# Patient Record
(demographics unavailable — no encounter records)

---

## 2025-03-12 NOTE — PHYSICAL EXAM
[Normocephalic] : normocephalic [Atraumatic] : atraumatic [EOMI] : extra ocular movement intact [Sclera nonicteric] : sclera nonicteric [Supple] : supple [No Supraclavicular Adenopathy] : no supraclavicular adenopathy [No Cervical Adenopathy] : no cervical adenopathy [No Thyromegaly] : no thyromegaly [Examined in the supine and seated position] : examined in the supine and seated position [Asymmetrical] : asymmetrical [No dominant masses] : no dominant masses in right breast  [No dominant masses] : no dominant masses left breast [No Nipple Retraction] : no left nipple retraction [No Nipple Discharge] : no left nipple discharge [No Axillary Lymphadenopathy] : no left axillary lymphadenopathy [No Edema] : no edema [No Rashes] : no rashes [No Ulceration] : no ulceration [Clear to Auscultation Bilat] : clear to auscultation bilaterally [Normal Sinus Rhythm] : normal sinus rhythm [Normal S1, S2] : normal S1 and S2 [No Gallops] : no gallops [No Rubs] : no pericardial rub [Bra Size: ___] : Bra Size: [unfilled] [de-identified] : Left breast is larger than the right.  [de-identified] : Well healed right lower and right axillary incisions. No palpable breast masses. Core scar:

## 2025-03-12 NOTE — PHYSICAL EXAM
[Normocephalic] : normocephalic [Atraumatic] : atraumatic [EOMI] : extra ocular movement intact [Sclera nonicteric] : sclera nonicteric [Supple] : supple [No Supraclavicular Adenopathy] : no supraclavicular adenopathy [No Cervical Adenopathy] : no cervical adenopathy [No Thyromegaly] : no thyromegaly [Examined in the supine and seated position] : examined in the supine and seated position [Asymmetrical] : asymmetrical [No dominant masses] : no dominant masses in right breast  [No dominant masses] : no dominant masses left breast [No Nipple Retraction] : no left nipple retraction [No Nipple Discharge] : no left nipple discharge [No Axillary Lymphadenopathy] : no left axillary lymphadenopathy [No Edema] : no edema [No Rashes] : no rashes [No Ulceration] : no ulceration [Clear to Auscultation Bilat] : clear to auscultation bilaterally [Normal Sinus Rhythm] : normal sinus rhythm [Normal S1, S2] : normal S1 and S2 [No Gallops] : no gallops [No Rubs] : no pericardial rub [Bra Size: ___] : Bra Size: [unfilled] [de-identified] : Left breast is larger than the right.  [de-identified] : Well healed right lower and right axillary incisions. No palpable breast masses. Core scar:

## 2025-03-12 NOTE — CONSULT LETTER
[Dear  ___] : Dear  [unfilled], [Courtesy Letter:] : I had the pleasure of seeing your patient, [unfilled], in my office today. [Please see my note below.] : Please see my note below. [Sincerely,] : Sincerely, [DrPapito  ___] : Dr. GARDNER [DrPapito ___] : Dr. GARDNER [FreeTextEntry3] : Susan M. Palleschi, MD, FACS Division of Breast Surgery Director, Breast Surgery [address] [address] (Phone) [phone] (Fax) [phone]

## 2025-03-12 NOTE — ASSESSMENT
[FreeTextEntry1] : History of Stage IA right breast cancer, s/p right lumpectomy and XRT in 2014, followed by antihormonal medication. 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 I had an extensive discussion with Agnieszka informing her that this is a Stage 0 cancer which has an excellent prognosis with appropriate treatment.    1. Pending completion of her workup, the surgical treatment options of a right Belkys  localization wide excision lumpectomy, followed by post-operative XRT (to reduce risk of local recurrence), vs. right total mastectomy, vs. bilateral total mastectomies with injection of bilateral breasts with Magtrace, if she should so choose, were discussed in detail. The indications, alternatives, benefits and risks were discussed, including but not limited to bleeding, infection, pain, scar, swelling, numbness, change in breast appearance, recurrence (1% risk per year with breast conservation versus <=5% lifetime risk with mastectomy), potential need for additional surgery and lymphedema (if she undergoes LN bx; 5-8% risk with SLN biopsy, 30% risk with axillary LN dissection). The breast cancer booklet, breast cancer treatment plan and postoperative instructions were reviewed and provided to the patient. 2. The lymphedema instruction sheet was reviewed and provided (only applies if she were to need a LN biopsy). 3. I discussed with her the potential for postoperative radiation therapy. She has a prior history of right breast XRT 11 years ago. I will arrange for a preop consultation with rad onc to determine if 2nd course of XRT is feasible. 4. I discussed with her the potential for postoperative adjuvant therapy based on the final results of the surgical path, including the possibility of chemotherapy (if invasive cancer diagnosed; 15% risk of upgrade to invasive cancer on surgical pathology) and/or anti-hormonal therapy as indicated. I will refer her back to Dr. Green. 5. Slide review: to be requested prior to proceeding with surgery. 6. Bilateral breast MRI with IV contrast: advise preop to rule out additional areas of disease for surgical planning. 7. Genetic testing: advise comprehensive genetic testing. I discussed with her that if the genetic testing reveals a breast cancer mutation, I will discuss with her the potential for bilateral mastectomies, however, given her age, breast conservation therapy will likely be more appropriate. [facility] genetic testing drawn today. I will call her with results. 8. Reconstruction: discussed with patient option of reconstruction if she undergoes mastectomy(ies). I discussed with her the potential for oncoplastic reduction/lift if she were to undergo lumpectomy. I discussed her case with Dr. Chandler who offered potential left breast reduction/lift at the time of right lumpectomy.  9. She states that if her MRI, radiation oncology consultation and genetic testing allow the feasibility of breast conservation, she would likely opt for lumpectomy. 10 Her surgery will be scheduled thereafter. 11. Annual bilateral mammogram and breast ultrasound due 2/2025 12. Biomarkers pending.   This patient encounter took 70 minutes today to perform records review, chart preparation, clinical encounter, coordination of care and documentation. I personally reviewed her breast imaging.

## 2025-03-12 NOTE — HISTORY OF PRESENT ILLNESS
[FreeTextEntry1] : The patient is a 78 year old female here today for a preop office visit for newly diagnosed right breast DCIS. PCP: Dr. Katarzyna Chand Gyn: Dr. Naima Henley Pulmonologist: Dr. Leonardo Gage (asthma) She has a history of right breast cancer (Stage IA, 1/29/2014, age 67), s/p right 6:00 lumpectomy, right axillary sentinel lymph node biopsy. Pathology invasive ductal carcinoma, grade 2, measured 1 cm, 1 LN negative, ER+/FL+/Her2- Rad Onc: Dr. Ruvalcaba, completed XRT Med Onc: Dr. Green, completed 5 years + of anithormonal medication (last being Tamoxifen) completed 1/31/2020, sees her prn. No family history of breast or ovarian cancer. Her paternal grandmother and maternal grandfather had lung cancer.  No genetic testing done previously. She is s/p benign left breast MRI guided biopsy on 1/21/2014 and benign right breast ultrasound guided core biopsy on 7/14/2014. 2/27/2025 Bilateral Mammogram: There are scattered areas of fibroglandular density. In the lower central RIGHT breast, anterior to middle depth, there are loosely grouped calcifications in linear distribution. There are stable postsurgical changes in the central posterior RIGHT breast and RIGHT axilla. No suspicious mass, suspicious microcalcifications, or other sign of malignancy is identified in the LEFT breast. Bilateral biopsy clips remain in place. Stable benign calcifications in the central posterior LEFT breast. BREAST ARTERIAL CALCIFICATION (CIERA): Grade 0 - No vascular calcifications. Note: The absence of breast arterial calcification does not exclude cardiovascular disease. Management of cardiovascular risk factors should be based clinically. Bilateral ultrasound:  RIGHT BREAST: No suspicious solid mass. Stable postsurgical scarring at 6:00 in the RIGHT breast. LEFT BREAST: No suspicious solid mass. BI-RADS 0, recommend further evaluation with right diagnostic mammography. 3/3/2025 Right diagnostic mammogram: A grouping of indeterminate calcifications is seen in the central lower breast. Multiple additional similar appearing calcifications are noted in a linear distribution. Some of these may be vascular, but further evaluation with stereotactic biopsy is advised. BI-RADS 4A, advise right stereotactic guided core biopsy. 3/7/2025 Right breast lower central stereotactic guided core biopsy (cork clip). Pathology: - DUCTAL CARCINOMA IN SITU, LOW TO INTERMEDIATE NUCLEAR GRADE, SOLID TYPE, WITH CALCIFICATIONS AND CENTRAL NECROSIS. - ER/FL STUDY IN PROGRESS; AN ADDENDUM WILL FOLLOW. These results are CONCORDANT. Given the malignant biopsy results above, surgical consultation and management is recommended. Please note the biopsied calcifications span approximately 3.5 cm in AP extent, to within 1.8 cm of the right nipple on magnification views from the study dated 3/3/2025. She is here with her sister in law, Marlene Cotton

## 2025-03-12 NOTE — CONSULT LETTER
[Dear  ___] : Dear  [unfilled], [Courtesy Letter:] : I had the pleasure of seeing your patient, [unfilled], in my office today. [Please see my note below.] : Please see my note below. [Sincerely,] : Sincerely, [DrPapito  ___] : Dr. GARDNER [DrPapito ___] : Dr. GARDNER [FreeTextEntry3] : Susan M. Palleschi, MD, FACS Division of Breast Surgery Director, Breast Surgery [address] [address] (Phone) [phone] (Fax) [phone]

## 2025-03-31 NOTE — VITALS
[Maximal Pain Intensity: 0/10] : 0/10 [90: Able to carry normal activity; minor signs or symptoms of disease.] : 90: Able to carry normal activity; minor signs or symptoms of disease.  [3 - Distress Level] : Distress Level: 3

## 2025-04-05 NOTE — PHYSICAL EXAM
[Sclera] : the sclera and conjunctiva were normal [Outer Ear] : the ears and nose were normal in appearance [] : no rash [No Focal Deficits] : no focal deficits [Normal] : oriented to person, place and time, the affect was normal, the mood was normal and not anxious [Heart Rate And Rhythm] : heart rate and rhythm were normal [Abdomen Soft] : soft [Nondistended] : nondistended [Axillary Lymph Nodes Enlarged Bilaterally] : axillary [Supraclavicular Lymph Nodes Enlarged Bilaterally] : supraclavicular [de-identified] : Right breast with well-healed lumpectomy site at 6:00 adjacent to inframammary fold with mild fibrosis and retraction of breast, steri-strip over the fresh biopsy close to right nipple.  No erythema.

## 2025-04-05 NOTE — HISTORY OF PRESENT ILLNESS
[FreeTextEntry1] : Ms. Ortiz is a 78-year-old female with newly diagnosed right DCIS, ER/NH positive, and presents for radiotherapy recommendations.   She has a history of a Stage IA right breast cancer treated with a lumpectomy and adjuvant radiation therapy in 2014. Lumpectomy at 6:00 showed a 1 cm invasive ductal carcinoma, grade 2, ER+NH+HER2 negative. She received a course of adjuvant radiation therapy to the right breast, a total dose of 6040 cGy from 3/17/2014 - 5/1/2014 under the care of Dr. Carmela Ruvalcaba. She completed 5 years of tamoxifen in 2020 under the care of Dr. Eliza Green.   Bilateral mammogram on 2/27/2025 showed scattered areas of fibroglandular density. In the lower central right breast, anterior to middle depth, there were loosely grouped calcifications in linear distribution. There were stable postsurgical changes in the central posterior right breast and right axilla. No suspicious mass, suspicious microcalcifications, or other sign of malignancy was identified in the left breast. Stable benign calcifications in the central posterior left breast.  Right diagnostic mammogram 3/3/2025 showed a grouping of indeterminate calcifications is seen in the central lower breast. Multiple additional similar appearing calcifications were noted in a linear distribution.   On 3/7/2025, she underwent a right breast lower central stereotactic guided core biopsy. Pathology showed DCIS, low to intermediate nuclear grade, with calcifications and central necrosis.  ER positive 98%, NH positive 90%.  MRI breast 3/19/2025 demonstrated a biopsy clip susceptibility artifact associated with a small hematoma in the lower central right breast corresponding to the site of biopsy-proven DCIS. Nonmass enhancement surrounds the clip extending both posteriorly and predominantly anteriorly, to the base of the nipple spanning 5.7 cm in AP extent and including residual calcifications on mammography which span approximately 3.5 cm in AP extent. No additional suspicious enhancing findings in the right breast and no suspicious enhancing findings in the left breast.  No lymphadenopathy. BIRADS 4A.  Biopsy of the anterior aspect of the linear nonmass enhancement in the right breast, closer to the nipple, was done on 3/26/25 and pathology is currently pending.   She has been feeling generally well. She likes to travel and remains very active and involved.

## 2025-04-05 NOTE — HISTORY OF PRESENT ILLNESS
[FreeTextEntry1] : Ms. Ortiz is a 78-year-old female with newly diagnosed right DCIS, ER/MS positive, and presents for radiotherapy recommendations.   She has a history of a Stage IA right breast cancer treated with a lumpectomy and adjuvant radiation therapy in 2014. Lumpectomy at 6:00 showed a 1 cm invasive ductal carcinoma, grade 2, ER+MS+HER2 negative. She received a course of adjuvant radiation therapy to the right breast, a total dose of 6040 cGy from 3/17/2014 - 5/1/2014 under the care of Dr. Carmela Ruvalcaba. She completed 5 years of tamoxifen in 2020 under the care of Dr. Eliza Green.   Bilateral mammogram on 2/27/2025 showed scattered areas of fibroglandular density. In the lower central right breast, anterior to middle depth, there were loosely grouped calcifications in linear distribution. There were stable postsurgical changes in the central posterior right breast and right axilla. No suspicious mass, suspicious microcalcifications, or other sign of malignancy was identified in the left breast. Stable benign calcifications in the central posterior left breast.  Right diagnostic mammogram 3/3/2025 showed a grouping of indeterminate calcifications is seen in the central lower breast. Multiple additional similar appearing calcifications were noted in a linear distribution.   On 3/7/2025, she underwent a right breast lower central stereotactic guided core biopsy. Pathology showed DCIS, low to intermediate nuclear grade, with calcifications and central necrosis.  ER positive 98%, MS positive 90%.  MRI breast 3/19/2025 demonstrated a biopsy clip susceptibility artifact associated with a small hematoma in the lower central right breast corresponding to the site of biopsy-proven DCIS. Nonmass enhancement surrounds the clip extending both posteriorly and predominantly anteriorly, to the base of the nipple spanning 5.7 cm in AP extent and including residual calcifications on mammography which span approximately 3.5 cm in AP extent. No additional suspicious enhancing findings in the right breast and no suspicious enhancing findings in the left breast.  No lymphadenopathy. BIRADS 4A.  Biopsy of the anterior aspect of the linear nonmass enhancement in the right breast, closer to the nipple, was done on 3/26/25 and pathology is currently pending.   She has been feeling generally well. She likes to travel and remains very active and involved.

## 2025-04-05 NOTE — LETTER CLOSING
[Consult Closing:] : Thank you for allowing me to participate in the care of this patient.  If you have any questions, please do not hesitate to contact me. [Sincerely yours,] : Sincerely yours, [FreeTextEntry3] : Saskia Miller MD

## 2025-04-05 NOTE — HISTORY OF PRESENT ILLNESS
[FreeTextEntry1] : Ms. Ortiz is a 78-year-old female with newly diagnosed right DCIS, ER/MO positive, and presents for radiotherapy recommendations.   She has a history of a Stage IA right breast cancer treated with a lumpectomy and adjuvant radiation therapy in 2014. Lumpectomy at 6:00 showed a 1 cm invasive ductal carcinoma, grade 2, ER+MO+HER2 negative. She received a course of adjuvant radiation therapy to the right breast, a total dose of 6040 cGy from 3/17/2014 - 5/1/2014 under the care of Dr. Carmela Ruvalcaba. She completed 5 years of tamoxifen in 2020 under the care of Dr. Eliza Green.   Bilateral mammogram on 2/27/2025 showed scattered areas of fibroglandular density. In the lower central right breast, anterior to middle depth, there were loosely grouped calcifications in linear distribution. There were stable postsurgical changes in the central posterior right breast and right axilla. No suspicious mass, suspicious microcalcifications, or other sign of malignancy was identified in the left breast. Stable benign calcifications in the central posterior left breast.  Right diagnostic mammogram 3/3/2025 showed a grouping of indeterminate calcifications is seen in the central lower breast. Multiple additional similar appearing calcifications were noted in a linear distribution.   On 3/7/2025, she underwent a right breast lower central stereotactic guided core biopsy. Pathology showed DCIS, low to intermediate nuclear grade, with calcifications and central necrosis.  ER positive 98%, MO positive 90%.  MRI breast 3/19/2025 demonstrated a biopsy clip susceptibility artifact associated with a small hematoma in the lower central right breast corresponding to the site of biopsy-proven DCIS. Nonmass enhancement surrounds the clip extending both posteriorly and predominantly anteriorly, to the base of the nipple spanning 5.7 cm in AP extent and including residual calcifications on mammography which span approximately 3.5 cm in AP extent. No additional suspicious enhancing findings in the right breast and no suspicious enhancing findings in the left breast.  No lymphadenopathy. BIRADS 4A.  Biopsy of the anterior aspect of the linear nonmass enhancement in the right breast, closer to the nipple, was done on 3/26/25 and pathology is currently pending.   She has been feeling generally well. She likes to travel and remains very active and involved.

## 2025-04-05 NOTE — HISTORY OF PRESENT ILLNESS
[FreeTextEntry1] : Ms. Ortiz is a 78-year-old female with newly diagnosed right DCIS, ER/OK positive, and presents for radiotherapy recommendations.   She has a history of a Stage IA right breast cancer treated with a lumpectomy and adjuvant radiation therapy in 2014. Lumpectomy at 6:00 showed a 1 cm invasive ductal carcinoma, grade 2, ER+OK+HER2 negative. She received a course of adjuvant radiation therapy to the right breast, a total dose of 6040 cGy from 3/17/2014 - 5/1/2014 under the care of Dr. Carmela Ruvalcaba. She completed 5 years of tamoxifen in 2020 under the care of Dr. Eliza Green.   Bilateral mammogram on 2/27/2025 showed scattered areas of fibroglandular density. In the lower central right breast, anterior to middle depth, there were loosely grouped calcifications in linear distribution. There were stable postsurgical changes in the central posterior right breast and right axilla. No suspicious mass, suspicious microcalcifications, or other sign of malignancy was identified in the left breast. Stable benign calcifications in the central posterior left breast.  Right diagnostic mammogram 3/3/2025 showed a grouping of indeterminate calcifications is seen in the central lower breast. Multiple additional similar appearing calcifications were noted in a linear distribution.   On 3/7/2025, she underwent a right breast lower central stereotactic guided core biopsy. Pathology showed DCIS, low to intermediate nuclear grade, with calcifications and central necrosis.  ER positive 98%, OK positive 90%.  MRI breast 3/19/2025 demonstrated a biopsy clip susceptibility artifact associated with a small hematoma in the lower central right breast corresponding to the site of biopsy-proven DCIS. Nonmass enhancement surrounds the clip extending both posteriorly and predominantly anteriorly, to the base of the nipple spanning 5.7 cm in AP extent and including residual calcifications on mammography which span approximately 3.5 cm in AP extent. No additional suspicious enhancing findings in the right breast and no suspicious enhancing findings in the left breast.  No lymphadenopathy. BIRADS 4A.  Biopsy of the anterior aspect of the linear nonmass enhancement in the right breast, closer to the nipple, was done on 3/26/25 and pathology is currently pending.   She has been feeling generally well. She likes to travel and remains very active and involved.

## 2025-04-05 NOTE — PHYSICAL EXAM
[Sclera] : the sclera and conjunctiva were normal [Outer Ear] : the ears and nose were normal in appearance [] : no rash [No Focal Deficits] : no focal deficits [Normal] : oriented to person, place and time, the affect was normal, the mood was normal and not anxious [Heart Rate And Rhythm] : heart rate and rhythm were normal [Abdomen Soft] : soft [Nondistended] : nondistended [Axillary Lymph Nodes Enlarged Bilaterally] : axillary [Supraclavicular Lymph Nodes Enlarged Bilaterally] : supraclavicular [de-identified] : Right breast with well-healed lumpectomy site at 6:00 adjacent to inframammary fold with mild fibrosis and retraction of breast, steri-strip over the fresh biopsy close to right nipple.  No erythema.

## 2025-04-05 NOTE — PHYSICAL EXAM
[Sclera] : the sclera and conjunctiva were normal [Outer Ear] : the ears and nose were normal in appearance [] : no rash [No Focal Deficits] : no focal deficits [Normal] : oriented to person, place and time, the affect was normal, the mood was normal and not anxious [Heart Rate And Rhythm] : heart rate and rhythm were normal [Abdomen Soft] : soft [Nondistended] : nondistended [Axillary Lymph Nodes Enlarged Bilaterally] : axillary [Supraclavicular Lymph Nodes Enlarged Bilaterally] : supraclavicular [de-identified] : Right breast with well-healed lumpectomy site at 6:00 adjacent to inframammary fold with mild fibrosis and retraction of breast, steri-strip over the fresh biopsy close to right nipple.  No erythema.

## 2025-04-05 NOTE — PHYSICAL EXAM
[Sclera] : the sclera and conjunctiva were normal [Outer Ear] : the ears and nose were normal in appearance [] : no rash [No Focal Deficits] : no focal deficits [Normal] : oriented to person, place and time, the affect was normal, the mood was normal and not anxious [Heart Rate And Rhythm] : heart rate and rhythm were normal [Abdomen Soft] : soft [Nondistended] : nondistended [Supraclavicular Lymph Nodes Enlarged Bilaterally] : supraclavicular [Axillary Lymph Nodes Enlarged Bilaterally] : axillary [de-identified] : Right breast with well-healed lumpectomy site at 6:00 adjacent to inframammary fold with mild fibrosis and retraction of breast, steri-strip over the fresh biopsy close to right nipple.  No erythema.

## 2025-04-14 NOTE — CONSULT LETTER
[Dear  ___] : Dear  [unfilled], [Courtesy Letter:] : I had the pleasure of seeing your patient, [unfilled], in my office today. [Please see my note below.] : Please see my note below. [Sincerely,] : Sincerely, [DrPapito  ___] : Dr. GARDNER [DrPapito ___] : Dr. GARDNER [FreeTextEntry3] : Susan M. Palleschi, MD, FACS Division of Breast Surgery Director, Breast Surgery [address] [address] (Phone) [phone] (Fax) [phone]

## 2025-04-14 NOTE — PHYSICAL EXAM
[Normocephalic] : normocephalic [Atraumatic] : atraumatic [EOMI] : extra ocular movement intact [Sclera nonicteric] : sclera nonicteric [Supple] : supple [No Supraclavicular Adenopathy] : no supraclavicular adenopathy [No Cervical Adenopathy] : no cervical adenopathy [No Thyromegaly] : no thyromegaly [Examined in the supine and seated position] : examined in the supine and seated position [Asymmetrical] : asymmetrical [Bra Size: ___] : Bra Size: [unfilled] [No dominant masses] : no dominant masses in right breast  [No dominant masses] : no dominant masses left breast [No Nipple Retraction] : no left nipple retraction [No Nipple Discharge] : no left nipple discharge [No Axillary Lymphadenopathy] : no left axillary lymphadenopathy [No Edema] : no edema [No Rashes] : no rashes [No Ulceration] : no ulceration [de-identified] : Left breast is larger than the right.  [de-identified] : right lower and right axillary incisions. No palpable breast masses. Core scar: right 7:30 (N4.5cm)

## 2025-04-14 NOTE — REVIEW OF SYSTEMS
[As Noted in HPI] : as noted in HPI [Negative] : Heme/Lymph [FreeTextEntry5] : hypertension [FreeTextEntry9] : arthritis  [de-identified] : migraine

## 2025-04-14 NOTE — ASSESSMENT
[FreeTextEntry1] : History of Stage IA right breast cancer, s/p right lumpectomy and XRT in 2014, followed by antihormonal medication. 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    1. She is opting to undergo a right mastectomy, with injection of right breast with Magtrace, with oncoplastic chest wall flat closure. The indications, alternatives, benefits and risks were discussed, including but not limited to bleeding, infection, pain, scar, swelling, numbness, change in breast appearance, recurrence (1% risk per year with breast conservation versus <=5% lifetime risk with mastectomy), potential need for additional surgery and lymphedema (if she undergoes LN bx; 5-8% risk with SLN biopsy, 30% risk with axillary LN dissection). The breast cancer booklet, breast cancer treatment plan and postoperative instructions were previously reviewed and provided to the patient. 2. The lymphedema instruction sheet was previously reviewed and provided (only applies if she were to need a LN biopsy). 3. I discussed with her the potential for postoperative adjuvant therapy based on the final results of the surgical path, including the possibility of chemotherapy (if invasive cancer diagnosed; 15% risk of upgrade to invasive cancer on surgical pathology) and/or anti-hormonal therapy as indicated. I will refer her back to Dr. Green. 4. Reconstruction: She is opting for oncoplastic flat chest wall closure, to be performed by Dr. Crooks. 5. Follow up with me post-op.  6. She is scheduled to see Dr. Franklin today for medical clearance.  The patient was seen and examined in the presence of Katya Cortés NP.

## 2025-04-14 NOTE — ASSESSMENT
[FreeTextEntry1] : History of Stage IA right breast cancer, s/p right lumpectomy and XRT in 2014, followed by antihormonal medication. 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    1. She is opting to undergo a right mastectomy, with injection of right breast with Magtrace, with oncoplastic chest wall flat closure. The indications, alternatives, benefits and risks were discussed, including but not limited to bleeding, infection, pain, scar, swelling, numbness, change in breast appearance, recurrence (1% risk per year with breast conservation versus <=5% lifetime risk with mastectomy), potential need for additional surgery and lymphedema (if she undergoes LN bx; 5-8% risk with SLN biopsy, 30% risk with axillary LN dissection). The breast cancer booklet, breast cancer treatment plan and postoperative instructions were previously reviewed and provided to the patient. 2. The lymphedema instruction sheet was previously reviewed and provided (only applies if she were to need a LN biopsy). 3. I discussed with her the potential for postoperative adjuvant therapy based on the final results of the surgical path, including the possibility of chemotherapy (if invasive cancer diagnosed; 15% risk of upgrade to invasive cancer on surgical pathology) and/or anti-hormonal therapy as indicated. I will refer her back to Dr. Green. 4. Reconstruction: She is opting for oncoplastic flat chest wall closure, to be performed by Dr. Crooks. 5. Follow up with me post-op.  6. She is scheduled to see Dr. Franklin today for medical clearance.  The patient was seen and examined in the presence of Katya Cortés NP.

## 2025-04-14 NOTE — HISTORY OF PRESENT ILLNESS
[FreeTextEntry1] : The patient is a 78 year old female here today for a 2nd preop office visit . PCP: Dr. Katarzyna Chand Gyn: Dr. Naima Henley Pulmonologist: Dr. Leonardo Gage (asthma) She has a history of right breast cancer (Stage IA, 1/29/2014, age 67), s/p right 6:00 lumpectomy, right axillary sentinel lymph node biopsy. Pathology invasive ductal carcinoma, grade 2, measured 1 cm, 1 LN negative, ER+/FL+/Her2- Rad Onc: Dr. Ruvalcaba, completed XRT Med Onc: Dr. Green, completed 5 years + of anithormonal medication (last being Tamoxifen) completed 1/31/2020, sees her prn. No family history of breast or ovarian cancer. Her paternal grandmother and maternal grandfather had lung cancer.  No genetic testing done previously. She is s/p benign left breast MRI guided biopsy on 1/21/2014 and benign right breast ultrasound guided core biopsy on 7/14/2014. 2/27/2025 Bilateral Mammogram: There are scattered areas of fibroglandular density. In the lower central RIGHT breast, anterior to middle depth, there are loosely grouped calcifications in linear distribution. There are stable postsurgical changes in the central posterior RIGHT breast and RIGHT axilla. No suspicious mass, suspicious microcalcifications, or other sign of malignancy is identified in the LEFT breast. Bilateral biopsy clips remain in place. Stable benign calcifications in the central posterior LEFT breast. BREAST ARTERIAL CALCIFICATION (CIERA): Grade 0 - No vascular calcifications. Note: The absence of breast arterial calcification does not exclude cardiovascular disease. Management of cardiovascular risk factors should be based clinically. Bilateral ultrasound:  RIGHT BREAST: No suspicious solid mass. Stable postsurgical scarring at 6:00 in the RIGHT breast. LEFT BREAST: No suspicious solid mass. BI-RADS 0, recommend further evaluation with right diagnostic mammography. 3/3/2025 Right diagnostic mammogram: A grouping of indeterminate calcifications is seen in the central lower breast. Multiple additional similar appearing calcifications are noted in a linear distribution. Some of these may be vascular, but further evaluation with stereotactic biopsy is advised. BI-RADS 4A, advise right stereotactic guided core biopsy. 3/7/2025 Right breast lower central stereotactic guided core biopsy (cork clip). Pathology: - DUCTAL CARCINOMA IN SITU, LOW TO INTERMEDIATE NUCLEAR GRADE, SOLID TYPE, WITH CALCIFICATIONS AND CENTRAL NECROSIS. - ER+(98%)/FL+(90%) These results are CONCORDANT. Given the malignant biopsy results above, surgical consultation and management is recommended. Please note the biopsied calcifications span approximately 3.5 cm in AP extent, to within 1.8 cm of the right nipple on magnification views from the study dated 3/3/2025. 3/12/2025 [employer] Genetic testing: negative 3/19/225 Bilateral breast MRI: RECENTLY DIAGNOSED MALIGNANCY: Breast: Right Location: Biopsy clip susceptibility artifact is visualized in the lower central right breast in association with a small 1.6 cm hematoma, corresponding to the site of biopsy-proven DCIS. There is nonmass enhancement extending both anterior and posterior to the biopsy clip measuring approximately 5.7 cm (95457:[id number]). Distance From Nipple:  Linear nonmass enhancement extends to the base of the nipple. Distance From Chest Wall: Approximately 6.2 cm. Distance From Skin: Approximately 3.1 cm from the skin of the lateral right breast. Chest Wall Invasion: No Skin Involvement: No Additional Features::None ADDITIONAL FINDINGS RIGHT BREAST: Postlumpectomy changes are visualized in the lower right breast. Biopsy clip susceptibility artifact is visualized in the upper outer right breast. There are scattered enhancing nonspecific foci.  There is no additional suspicious enhancement in the right breast. ADDITIONAL FINDINGS LEFT BREAST: Biopsy clip susceptibility artifact is visualized in the upper central left breast.  There are scattered enhancing nonspecific foci.  There is no suspicious enhancement in the left breast. AXILLA/OTHER: There is no significant axillary or internal mammary lymphadenopathy.  Small hepatic cysts are again noted. There is a small hiatus hernia. BI-RADS 4A, advise right MRI guided core biopsy 3/26/2025 Right breast anterior central MRI guided core biopsy (buckle clip). Pathology: - FOCAL ATYPICAL DUCTAL HYPERPLASIA - FIBROCYSTIC CHANGE Results are CONCORDANT. Please note the nonmass enhancement of which representative anterior aspect was biopsied expands approximately 5.7 cm in maximum AP dimension as seen on MRI dated 3/19/2025 extending from the biopsy-proven DCIS (cork clip) up to the base of the nipple including distribution of calcifications seen on mammogram. If breast conservation is desired recommend bracketed localization with wide excision anterior to the buckle clip (atypia) up to the base of the nipple and approximately 2 cm posterior to the cork clip (DCIS) to include associated nonmass enhancement on MRI (best seen on image 27 of series [id number] from MRI dated 3/19/2020). Rad ONC: She saw Dr. Miller on 3/31/2025. A second course of radiation therapy is feasible, given the interval of >10 years from the prior therapy, but would likely result in further fibrosis/scarring of the breast Plastic Surgeon: Dr. Ch. Saw him 4/7/2025. She is opting to undergo right mastectomy with oncoplastic flat chest wall closure. She is here for another preop office visit. She is here with her sister, Marlene. She is scheduled to see Dr. Franklin today for medical clearance.

## 2025-04-14 NOTE — CONSULT LETTER
[Dear  ___] : Dear  [unfilled], [Courtesy Letter:] : I had the pleasure of seeing your patient, [unfilled], in my office today. [Please see my note below.] : Please see my note below. [Sincerely,] : Sincerely, [DrPapito  ___] : Dr. GARDNER [DrPapito ___] : Dr. GARDNER [FreeTextEntry3] : Susan M. Palleschi, MD, FACS Division of Breast Surgery Director, Breast Surgery [address] [address] (Phone) [phone] (Fax) [phone]

## 2025-04-14 NOTE — PHYSICAL EXAM
[Normocephalic] : normocephalic [Atraumatic] : atraumatic [EOMI] : extra ocular movement intact [Sclera nonicteric] : sclera nonicteric [Supple] : supple [No Supraclavicular Adenopathy] : no supraclavicular adenopathy [No Cervical Adenopathy] : no cervical adenopathy [No Thyromegaly] : no thyromegaly [Examined in the supine and seated position] : examined in the supine and seated position [Asymmetrical] : asymmetrical [Bra Size: ___] : Bra Size: [unfilled] [No dominant masses] : no dominant masses in right breast  [No dominant masses] : no dominant masses left breast [No Nipple Retraction] : no left nipple retraction [No Nipple Discharge] : no left nipple discharge [No Axillary Lymphadenopathy] : no left axillary lymphadenopathy [No Edema] : no edema [No Rashes] : no rashes [No Ulceration] : no ulceration [de-identified] : Left breast is larger than the right.  [de-identified] : right lower and right axillary incisions. No palpable breast masses. Core scar: right 7:30 (N4.5cm)

## 2025-04-14 NOTE — REVIEW OF SYSTEMS
[As Noted in HPI] : as noted in HPI [Negative] : Heme/Lymph [FreeTextEntry5] : hypertension [FreeTextEntry9] : arthritis  [de-identified] : migraine

## 2025-04-14 NOTE — HISTORY OF PRESENT ILLNESS
[FreeTextEntry1] : The patient is a 78 year old female here today for a 2nd preop office visit . PCP: Dr. Katarzyna Chand Gyn: Dr. Naima Henley Pulmonologist: Dr. Leonardo Gage (asthma) She has a history of right breast cancer (Stage IA, 1/29/2014, age 67), s/p right 6:00 lumpectomy, right axillary sentinel lymph node biopsy. Pathology invasive ductal carcinoma, grade 2, measured 1 cm, 1 LN negative, ER+/NC+/Her2- Rad Onc: Dr. Ruvalcaba, completed XRT Med Onc: Dr. Green, completed 5 years + of anithormonal medication (last being Tamoxifen) completed 1/31/2020, sees her prn. No family history of breast or ovarian cancer. Her paternal grandmother and maternal grandfather had lung cancer.  No genetic testing done previously. She is s/p benign left breast MRI guided biopsy on 1/21/2014 and benign right breast ultrasound guided core biopsy on 7/14/2014. 2/27/2025 Bilateral Mammogram: There are scattered areas of fibroglandular density. In the lower central RIGHT breast, anterior to middle depth, there are loosely grouped calcifications in linear distribution. There are stable postsurgical changes in the central posterior RIGHT breast and RIGHT axilla. No suspicious mass, suspicious microcalcifications, or other sign of malignancy is identified in the LEFT breast. Bilateral biopsy clips remain in place. Stable benign calcifications in the central posterior LEFT breast. BREAST ARTERIAL CALCIFICATION (CIERA): Grade 0 - No vascular calcifications. Note: The absence of breast arterial calcification does not exclude cardiovascular disease. Management of cardiovascular risk factors should be based clinically. Bilateral ultrasound:  RIGHT BREAST: No suspicious solid mass. Stable postsurgical scarring at 6:00 in the RIGHT breast. LEFT BREAST: No suspicious solid mass. BI-RADS 0, recommend further evaluation with right diagnostic mammography. 3/3/2025 Right diagnostic mammogram: A grouping of indeterminate calcifications is seen in the central lower breast. Multiple additional similar appearing calcifications are noted in a linear distribution. Some of these may be vascular, but further evaluation with stereotactic biopsy is advised. BI-RADS 4A, advise right stereotactic guided core biopsy. 3/7/2025 Right breast lower central stereotactic guided core biopsy (cork clip). Pathology: - DUCTAL CARCINOMA IN SITU, LOW TO INTERMEDIATE NUCLEAR GRADE, SOLID TYPE, WITH CALCIFICATIONS AND CENTRAL NECROSIS. - ER+(98%)/NC+(90%) These results are CONCORDANT. Given the malignant biopsy results above, surgical consultation and management is recommended. Please note the biopsied calcifications span approximately 3.5 cm in AP extent, to within 1.8 cm of the right nipple on magnification views from the study dated 3/3/2025. 3/12/2025 [employer] Genetic testing: negative 3/19/225 Bilateral breast MRI: RECENTLY DIAGNOSED MALIGNANCY: Breast: Right Location: Biopsy clip susceptibility artifact is visualized in the lower central right breast in association with a small 1.6 cm hematoma, corresponding to the site of biopsy-proven DCIS. There is nonmass enhancement extending both anterior and posterior to the biopsy clip measuring approximately 5.7 cm (49257:26). Distance From Nipple:  Linear nonmass enhancement extends to the base of the nipple. Distance From Chest Wall: Approximately 6.2 cm. Distance From Skin: Approximately 3.1 cm from the skin of the lateral right breast. Chest Wall Invasion: No Skin Involvement: No Additional Features::None ADDITIONAL FINDINGS RIGHT BREAST: Postlumpectomy changes are visualized in the lower right breast. Biopsy clip susceptibility artifact is visualized in the upper outer right breast. There are scattered enhancing nonspecific foci.  There is no additional suspicious enhancement in the right breast. ADDITIONAL FINDINGS LEFT BREAST: Biopsy clip susceptibility artifact is visualized in the upper central left breast.  There are scattered enhancing nonspecific foci.  There is no suspicious enhancement in the left breast. AXILLA/OTHER: There is no significant axillary or internal mammary lymphadenopathy.  Small hepatic cysts are again noted. There is a small hiatus hernia. BI-RADS 4A, advise right MRI guided core biopsy 3/26/2025 Right breast anterior central MRI guided core biopsy (buckle clip). Pathology: - FOCAL ATYPICAL DUCTAL HYPERPLASIA - FIBROCYSTIC CHANGE Results are CONCORDANT. Please note the nonmass enhancement of which representative anterior aspect was biopsied expands approximately 5.7 cm in maximum AP dimension as seen on MRI dated 3/19/2025 extending from the biopsy-proven DCIS (cork clip) up to the base of the nipple including distribution of calcifications seen on mammogram. If breast conservation is desired recommend bracketed localization with wide excision anterior to the buckle clip (atypia) up to the base of the nipple and approximately 2 cm posterior to the cork clip (DCIS) to include associated nonmass enhancement on MRI (best seen on image 27 of series [id number] from MRI dated 3/19/2020). Rad ONC: She saw Dr. Miller on 3/31/2025. A second course of radiation therapy is feasible, given the interval of >10 years from the prior therapy, but would likely result in further fibrosis/scarring of the breast Plastic Surgeon: Dr. Ch. Saw him 4/7/2025. She is opting to undergo right mastectomy with oncoplastic flat chest wall closure. She is here for another preop office visit. She is here with her sister, Marlene. She is scheduled to see Dr. Franklin today for medical clearance.

## 2025-04-15 NOTE — HISTORY OF PRESENT ILLNESS
[FreeTextEntry1] : YASIR CUELLAR is a 78 year old female presenting to the office today with newly diagnosed right breast DCIS. She is scheduled for a right breast mastectomy. She is requesting an oncoplastic flat closure at the time of surgery.    PMHx- asthma, HLD, h/o right breast cancer   PSHx- tonsillectomy, D&C, tubal ligation, knee replacement, right macular pucker, right breast lumpectomy (2014)   Currently taking - zetia, fish oil, singulair, simvastatin, albuterol   Allergies- chlorhexidine (rash and burn), codeine sensitivity (dizziness, nausea)   Never smoker

## 2025-04-15 NOTE — ADDENDUM
[FreeTextEntry1] :  I, Leonela Lucio, documented this note as a scribe on behalf of Dr. Lauren Shikowitz-Behr, MD on 04/15/2025.

## 2025-04-15 NOTE — END OF VISIT
[FreeTextEntry3] : All medical record entries made by the Scribe were at my, Dr. Lauren Shikowitz-Behr, MD, direction and personally dictated by me on 04/15/2025. I have reviewed the chart and agree that the record accurately reflects my personal performance of the history, physical exam, assessment and plan. I have also personally directed, reviewed, and agreed with the chart.

## 2025-04-15 NOTE — PHYSICAL EXAM
[NI] : Normal [de-identified] : NAD, AxOx3  [de-identified] : nonlabored breathing  [de-identified] : normal HR [de-identified] : RIGHT: SN-N 28.5, N-IMF 8, BW 14 LEFT: SN-N 28, N-IMF 12.5, BW 14 no masses, no nipple discharge nor retraction Grade 3 ptosis bilaterally  Breast asymmetry left > right  Well healed right IMF and axillary scars [de-identified] : no edema  [de-identified] : grossly intact  [de-identified] : normal HR

## 2025-04-15 NOTE — PHYSICAL EXAM
[NI] : Normal [de-identified] : NAD, AxOx3  [de-identified] : nonlabored breathing  [de-identified] : normal HR [de-identified] : RIGHT: SN-N 28.5, N-IMF 8, BW 14 LEFT: SN-N 28, N-IMF 12.5, BW 14 no masses, no nipple discharge nor retraction Grade 3 ptosis bilaterally  Breast asymmetry left > right  Well healed right IMF and axillary scars [de-identified] : no edema  [de-identified] : grossly intact  [de-identified] : normal HR

## 2025-04-29 NOTE — PHYSICAL EXAM
[de-identified] : NAD, AxOx3  [de-identified] : nonlabored breathing  [de-identified] : Right flat incision clean, dry, and intact  No evidence of hematoma, infection, or skin breakdown  Drain is serous in output [de-identified] : no edema  [de-identified] : as above [de-identified] : normal affect

## 2025-04-29 NOTE — ADDENDUM
[FreeTextEntry1] :  I, Leonela Lucio, documented this note as a scribe on behalf of LASHAE Ortiz on  04/29/2025.

## 2025-04-29 NOTE — HISTORY OF PRESENT ILLNESS
[FreeTextEntry1] : YASIR CUELLAR is an 78 year old female who presents today for first postoperative visit s/p right mastectomy with oncoplastic flat closure/adjacent tissue transfer on 4/18/25.  Patient has no complaints today. As per patient, the remaining drain does not meet criteria for removal. Outputs over last 3 days: 53cc, 17.5cc, and 30cc so far today.

## 2025-05-05 NOTE — ASSESSMENT
[FreeTextEntry1] : History of Stage IA right breast cancer, s/p right lumpectomy and XRT in 2014, followed by antihormonal medication. S/P right total mastectomy with oncoplastic flat chest wall closure, surgical path DCIS, solid type, intermediate to high nuclear grade involving lactiferous ducts, 3 mm from the surface of the skin, ER+/FL+    1. Pathology reviewed with patient, copy provided 2. Follow up office visit 6/2025 3. Advised monthly self breast examinations and advised her to contact me if she has any concerns. 4. Annual left mammogram and breast ultrasound due 2/2026 5. Medical Oncology: I will refer her back to Dr. Green 6. Physical Therapy: will refer to [facility] 7. I gave her the list of bra boutiques

## 2025-05-05 NOTE — PHYSICAL EXAM
[Normocephalic] : normocephalic [Atraumatic] : atraumatic [EOMI] : extra ocular movement intact [Sclera nonicteric] : sclera nonicteric [Supple] : supple [Examined in the supine and seated position] : examined in the supine and seated position [No dominant masses] : no dominant masses in right breast  [de-identified] : S/P right total mastectomy with oncoplastic flat chest wall closure. Incision C/D/I, no erythema or warmth, no evidence of infection, no palpable seroma/hematoma. SWETHA drain in place, serosanginous.

## 2025-05-05 NOTE — CONSULT LETTER
[Dear  ___] : Dear  [unfilled], [Courtesy Letter:] : I had the pleasure of seeing your patient, [unfilled], in my office today. [Please see my note below.] : Please see my note below. [Sincerely,] : Sincerely, [DrPapito  ___] : Dr. GARDNER [DrPapito ___] : Dr. GARDNER [FreeTextEntry3] : Susan M. Palleschi, MD, FACS Division of Breast Surgery Director, Breast Surgery [address] [address] (Phone) [phone] (Fax) [phone]

## 2025-05-05 NOTE — ASSESSMENT
[FreeTextEntry1] : History of Stage IA right breast cancer, s/p right lumpectomy and XRT in 2014, followed by antihormonal medication. S/P right total mastectomy with oncoplastic flat chest wall closure, surgical path DCIS, solid type, intermediate to high nuclear grade involving lactiferous ducts, 3 mm from the surface of the skin, ER+/MO+    1. Pathology reviewed with patient, copy provided 2. Follow up office visit 6/2025 3. Advised monthly self breast examinations and advised her to contact me if she has any concerns. 4. Annual left mammogram and breast ultrasound due 2/2026 5. Medical Oncology: I will refer her back to Dr. Green 6. Physical Therapy: will refer to [facility] 7. I gave her the list of bra boutiques

## 2025-05-05 NOTE — HISTORY OF PRESENT ILLNESS
[FreeTextEntry1] : The patient is a 78 year old female here today for a post-op visit. PCP: Dr. Katarzyna Chand Gyn: Dr. Naima Henley Pulmonologist: Dr. Leonardo Gage (asthma) She has a history of right breast cancer (Stage IA, 1/29/2014, age 67), s/p right 6:00 lumpectomy, right axillary sentinel lymph node biopsy. Pathology invasive ductal carcinoma, grade 2, measured 1 cm, 1 LN negative, ER+/MD+/Her2- Rad Onc: Dr. Ruvalcaba, completed XRT Med Onc: Dr. Green, completed 5 years + of anithormonal medication (last being Tamoxifen) completed 1/31/2020, sees her prn. No family history of breast or ovarian cancer. Her paternal grandmother and maternal grandfather had lung cancer.  No genetic testing done previously. She is s/p benign left breast MRI guided biopsy on 1/21/2014 and benign right breast ultrasound guided core biopsy on 7/14/2014. 2/27/2025 Bilateral Mammogram: There are scattered areas of fibroglandular density. In the lower central RIGHT breast, anterior to middle depth, there are loosely grouped calcifications in linear distribution. There are stable postsurgical changes in the central posterior RIGHT breast and RIGHT axilla. No suspicious mass, suspicious microcalcifications, or other sign of malignancy is identified in the LEFT breast. Bilateral biopsy clips remain in place. Stable benign calcifications in the central posterior LEFT breast. BREAST ARTERIAL CALCIFICATION (CIERA): Grade 0 - No vascular calcifications. Note: The absence of breast arterial calcification does not exclude cardiovascular disease. Management of cardiovascular risk factors should be based clinically. Bilateral ultrasound:  RIGHT BREAST: No suspicious solid mass. Stable postsurgical scarring at 6:00 in the RIGHT breast. LEFT BREAST: No suspicious solid mass. BI-RADS 0, recommend further evaluation with right diagnostic mammography. 3/3/2025 Right diagnostic mammogram: A grouping of indeterminate calcifications is seen in the central lower breast. Multiple additional similar appearing calcifications are noted in a linear distribution. Some of these may be vascular, but further evaluation with stereotactic biopsy is advised. BI-RADS 4A, advise right stereotactic guided core biopsy. 3/7/2025 Right breast lower central stereotactic guided core biopsy (cork clip). Pathology: - DUCTAL CARCINOMA IN SITU, LOW TO INTERMEDIATE NUCLEAR GRADE, SOLID TYPE, WITH CALCIFICATIONS AND CENTRAL NECROSIS. - ER+(98%)/MD+(90%) These results are CONCORDANT. Given the malignant biopsy results above, surgical consultation and management is recommended. Please note the biopsied calcifications span approximately 3.5 cm in AP extent, to within 1.8 cm of the right nipple on magnification views from the study dated 3/3/2025. 3/12/2025 [facility] Genetic testing: negative 3/19/225 Bilateral breast MRI: RECENTLY DIAGNOSED MALIGNANCY: Breast: Right Location: Biopsy clip susceptibility artifact is visualized in the lower central right breast in association with a small 1.6 cm hematoma, corresponding to the site of biopsy-proven DCIS. There is nonmass enhancement extending both anterior and posterior to the biopsy clip measuring approximately 5.7 cm (87802:[id number]). Distance From Nipple:  Linear nonmass enhancement extends to the base of the nipple. Distance From Chest Wall: Approximately 6.2 cm. Distance From Skin: Approximately 3.1 cm from the skin of the lateral right breast. Chest Wall Invasion: No Skin Involvement: No Additional Features::None ADDITIONAL FINDINGS RIGHT BREAST: Postlumpectomy changes are visualized in the lower right breast. Biopsy clip susceptibility artifact is visualized in the upper outer right breast. There are scattered enhancing nonspecific foci.  There is no additional suspicious enhancement in the right breast. ADDITIONAL FINDINGS LEFT BREAST: Biopsy clip susceptibility artifact is visualized in the upper central left breast.  There are scattered enhancing nonspecific foci.  There is no suspicious enhancement in the left breast. AXILLA/OTHER: There is no significant axillary or internal mammary lymphadenopathy.  Small hepatic cysts are again noted. There is a small hiatus hernia. BI-RADS 4A, advise right MRI guided core biopsy 3/26/2025 Right breast anterior central MRI guided core biopsy (buckle clip). Pathology: - FOCAL ATYPICAL DUCTAL HYPERPLASIA - FIBROCYSTIC CHANGE Results are CONCORDANT. Please note the nonmass enhancement of which representative anterior aspect was biopsied expands approximately 5.7 cm in maximum AP dimension as seen on MRI dated 3/19/2025 extending from the biopsy-proven DCIS (cork clip) up to the base of the nipple including distribution of calcifications seen on mammogram. If breast conservation is desired recommend bracketed localization with wide excision anterior to the buckle clip (atypia) up to the base of the nipple and approximately 2 cm posterior to the cork clip (DCIS) to include associated nonmass enhancement on MRI (best seen on image 27 of series [id number] from MRI dated 3/19/2020). Rad ONC: She saw Dr. Miller on 3/31/2025. A second course of radiation therapy is feasible, given the interval of >10 years from the prior therapy, but would likely result in further fibrosis/scarring of the breast 4/18/2025 Right total mastectomy, injection of right breast with Magtrace, right oncoplastic flat chest wall closure. 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 Chandler, saw Nani BHARDWAJ 4/29/2025, seeing Dr. Chandler  5/6/2025. She is with her sister, Marlene. She notes right axillary discomfort and some numbness.

## 2025-05-05 NOTE — CONSULT LETTER
[Dear  ___] : Dear  [unfilled], [Courtesy Letter:] : I had the pleasure of seeing your patient, [unfilled], in my office today. [Please see my note below.] : Please see my note below. [Sincerely,] : Sincerely, [DrPapito  ___] : Dr. GARDNER [DrPapito ___] : Dr. GARDNER [FreeTextEntry3] : Susan M. Palleschi, MD, FACS Division of Breast Surgery Director, Breast Surgery [address] [address] (Phone) [phone] (Fax) [phone]

## 2025-05-05 NOTE — HISTORY OF PRESENT ILLNESS
[FreeTextEntry1] : The patient is a 78 year old female here today for a post-op visit. PCP: Dr. Katarzyna Chand Gyn: Dr. Naima Henley Pulmonologist: Dr. Leonardo Gage (asthma) She has a history of right breast cancer (Stage IA, 1/29/2014, age 67), s/p right 6:00 lumpectomy, right axillary sentinel lymph node biopsy. Pathology invasive ductal carcinoma, grade 2, measured 1 cm, 1 LN negative, ER+/WI+/Her2- Rad Onc: Dr. Ruvalcaba, completed XRT Med Onc: Dr. Green, completed 5 years + of anithormonal medication (last being Tamoxifen) completed 1/31/2020, sees her prn. No family history of breast or ovarian cancer. Her paternal grandmother and maternal grandfather had lung cancer.  No genetic testing done previously. She is s/p benign left breast MRI guided biopsy on 1/21/2014 and benign right breast ultrasound guided core biopsy on 7/14/2014. 2/27/2025 Bilateral Mammogram: There are scattered areas of fibroglandular density. In the lower central RIGHT breast, anterior to middle depth, there are loosely grouped calcifications in linear distribution. There are stable postsurgical changes in the central posterior RIGHT breast and RIGHT axilla. No suspicious mass, suspicious microcalcifications, or other sign of malignancy is identified in the LEFT breast. Bilateral biopsy clips remain in place. Stable benign calcifications in the central posterior LEFT breast. BREAST ARTERIAL CALCIFICATION (CIERA): Grade 0 - No vascular calcifications. Note: The absence of breast arterial calcification does not exclude cardiovascular disease. Management of cardiovascular risk factors should be based clinically. Bilateral ultrasound:  RIGHT BREAST: No suspicious solid mass. Stable postsurgical scarring at 6:00 in the RIGHT breast. LEFT BREAST: No suspicious solid mass. BI-RADS 0, recommend further evaluation with right diagnostic mammography. 3/3/2025 Right diagnostic mammogram: A grouping of indeterminate calcifications is seen in the central lower breast. Multiple additional similar appearing calcifications are noted in a linear distribution. Some of these may be vascular, but further evaluation with stereotactic biopsy is advised. BI-RADS 4A, advise right stereotactic guided core biopsy. 3/7/2025 Right breast lower central stereotactic guided core biopsy (cork clip). Pathology: - DUCTAL CARCINOMA IN SITU, LOW TO INTERMEDIATE NUCLEAR GRADE, SOLID TYPE, WITH CALCIFICATIONS AND CENTRAL NECROSIS. - ER+(98%)/WI+(90%) These results are CONCORDANT. Given the malignant biopsy results above, surgical consultation and management is recommended. Please note the biopsied calcifications span approximately 3.5 cm in AP extent, to within 1.8 cm of the right nipple on magnification views from the study dated 3/3/2025. 3/12/2025 [facility] Genetic testing: negative 3/19/225 Bilateral breast MRI: RECENTLY DIAGNOSED MALIGNANCY: Breast: Right Location: Biopsy clip susceptibility artifact is visualized in the lower central right breast in association with a small 1.6 cm hematoma, corresponding to the site of biopsy-proven DCIS. There is nonmass enhancement extending both anterior and posterior to the biopsy clip measuring approximately 5.7 cm ([id number]:26). Distance From Nipple:  Linear nonmass enhancement extends to the base of the nipple. Distance From Chest Wall: Approximately 6.2 cm. Distance From Skin: Approximately 3.1 cm from the skin of the lateral right breast. Chest Wall Invasion: No Skin Involvement: No Additional Features::None ADDITIONAL FINDINGS RIGHT BREAST: Postlumpectomy changes are visualized in the lower right breast. Biopsy clip susceptibility artifact is visualized in the upper outer right breast. There are scattered enhancing nonspecific foci.  There is no additional suspicious enhancement in the right breast. ADDITIONAL FINDINGS LEFT BREAST: Biopsy clip susceptibility artifact is visualized in the upper central left breast.  There are scattered enhancing nonspecific foci.  There is no suspicious enhancement in the left breast. AXILLA/OTHER: There is no significant axillary or internal mammary lymphadenopathy.  Small hepatic cysts are again noted. There is a small hiatus hernia. BI-RADS 4A, advise right MRI guided core biopsy 3/26/2025 Right breast anterior central MRI guided core biopsy (buckle clip). Pathology: - FOCAL ATYPICAL DUCTAL HYPERPLASIA - FIBROCYSTIC CHANGE Results are CONCORDANT. Please note the nonmass enhancement of which representative anterior aspect was biopsied expands approximately 5.7 cm in maximum AP dimension as seen on MRI dated 3/19/2025 extending from the biopsy-proven DCIS (cork clip) up to the base of the nipple including distribution of calcifications seen on mammogram. If breast conservation is desired recommend bracketed localization with wide excision anterior to the buckle clip (atypia) up to the base of the nipple and approximately 2 cm posterior to the cork clip (DCIS) to include associated nonmass enhancement on MRI (best seen on image 27 of series [id number] from MRI dated 3/19/2020). Rad ONC: She saw Dr. Miller on 3/31/2025. A second course of radiation therapy is feasible, given the interval of >10 years from the prior therapy, but would likely result in further fibrosis/scarring of the breast 4/18/2025 Right total mastectomy, injection of right breast with Magtrace, right oncoplastic flat chest wall closure. 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 Chandler, saw Nani BHARDWAJ 4/29/2025, seeing Dr. Chandler  5/6/2025. She is with her sister, Marlene. She notes right axillary discomfort and some numbness.

## 2025-05-05 NOTE — PHYSICAL EXAM
[Normocephalic] : normocephalic [Atraumatic] : atraumatic [EOMI] : extra ocular movement intact [Sclera nonicteric] : sclera nonicteric [Supple] : supple [Examined in the supine and seated position] : examined in the supine and seated position [No dominant masses] : no dominant masses in right breast  [de-identified] : S/P right total mastectomy with oncoplastic flat chest wall closure. Incision C/D/I, no erythema or warmth, no evidence of infection, no palpable seroma/hematoma. SWETHA drain in place, serosanginous.

## 2025-05-05 NOTE — ASSESSMENT
[FreeTextEntry1] : History of Stage IA right breast cancer, s/p right lumpectomy and XRT in 2014, followed by antihormonal medication. S/P right total mastectomy with oncoplastic flat chest wall closure, surgical path DCIS, solid type, intermediate to high nuclear grade involving lactiferous ducts, 3 mm from the surface of the skin, ER+/AZ+    1. Pathology reviewed with patient, copy provided 2. Follow up office visit 6/2025 3. Advised monthly self breast examinations and advised her to contact me if she has any concerns. 4. Annual left mammogram and breast ultrasound due 2/2026 5. Medical Oncology: I will refer her back to Dr. Green 6. Physical Therapy: will refer to [facility] 7. I gave her the list of bra boutiques

## 2025-05-05 NOTE — PHYSICAL EXAM
[Normocephalic] : normocephalic [Atraumatic] : atraumatic [EOMI] : extra ocular movement intact [Sclera nonicteric] : sclera nonicteric [Supple] : supple [Examined in the supine and seated position] : examined in the supine and seated position [No dominant masses] : no dominant masses in right breast  [de-identified] : S/P right total mastectomy with oncoplastic flat chest wall closure. Incision C/D/I, no erythema or warmth, no evidence of infection, no palpable seroma/hematoma. SWETHA drain in place, serosanginous.

## 2025-05-05 NOTE — CONSULT LETTER
[Dear  ___] : Dear  [unfilled], [Courtesy Letter:] : I had the pleasure of seeing your patient, [unfilled], in my office today. [Please see my note below.] : Please see my note below. [Sincerely,] : Sincerely, [DrPapito  ___] : Dr. GARDNER [DrPapito ___] : Dr. GARDNER [FreeTextEntry3] : Susan M. Palleschi, MD, FACS Division of Breast Surgery Director, Breast Surgery [address] [address] (Phone) [phone] (Fax) [phone]

## 2025-05-05 NOTE — HISTORY OF PRESENT ILLNESS
[FreeTextEntry1] : The patient is a 78 year old female here today for a post-op visit. PCP: Dr. Katarzyna Chand Gyn: Dr. Naima Henley Pulmonologist: Dr. Leonardo Gage (asthma) She has a history of right breast cancer (Stage IA, 1/29/2014, age 67), s/p right 6:00 lumpectomy, right axillary sentinel lymph node biopsy. Pathology invasive ductal carcinoma, grade 2, measured 1 cm, 1 LN negative, ER+/DC+/Her2- Rad Onc: Dr. Ruvalcaba, completed XRT Med Onc: Dr. Green, completed 5 years + of anithormonal medication (last being Tamoxifen) completed 1/31/2020, sees her prn. No family history of breast or ovarian cancer. Her paternal grandmother and maternal grandfather had lung cancer.  No genetic testing done previously. She is s/p benign left breast MRI guided biopsy on 1/21/2014 and benign right breast ultrasound guided core biopsy on 7/14/2014. 2/27/2025 Bilateral Mammogram: There are scattered areas of fibroglandular density. In the lower central RIGHT breast, anterior to middle depth, there are loosely grouped calcifications in linear distribution. There are stable postsurgical changes in the central posterior RIGHT breast and RIGHT axilla. No suspicious mass, suspicious microcalcifications, or other sign of malignancy is identified in the LEFT breast. Bilateral biopsy clips remain in place. Stable benign calcifications in the central posterior LEFT breast. BREAST ARTERIAL CALCIFICATION (CIERA): Grade 0 - No vascular calcifications. Note: The absence of breast arterial calcification does not exclude cardiovascular disease. Management of cardiovascular risk factors should be based clinically. Bilateral ultrasound:  RIGHT BREAST: No suspicious solid mass. Stable postsurgical scarring at 6:00 in the RIGHT breast. LEFT BREAST: No suspicious solid mass. BI-RADS 0, recommend further evaluation with right diagnostic mammography. 3/3/2025 Right diagnostic mammogram: A grouping of indeterminate calcifications is seen in the central lower breast. Multiple additional similar appearing calcifications are noted in a linear distribution. Some of these may be vascular, but further evaluation with stereotactic biopsy is advised. BI-RADS 4A, advise right stereotactic guided core biopsy. 3/7/2025 Right breast lower central stereotactic guided core biopsy (cork clip). Pathology: - DUCTAL CARCINOMA IN SITU, LOW TO INTERMEDIATE NUCLEAR GRADE, SOLID TYPE, WITH CALCIFICATIONS AND CENTRAL NECROSIS. - ER+(98%)/DC+(90%) These results are CONCORDANT. Given the malignant biopsy results above, surgical consultation and management is recommended. Please note the biopsied calcifications span approximately 3.5 cm in AP extent, to within 1.8 cm of the right nipple on magnification views from the study dated 3/3/2025. 3/12/2025 [facility] Genetic testing: negative 3/19/225 Bilateral breast MRI: RECENTLY DIAGNOSED MALIGNANCY: Breast: Right Location: Biopsy clip susceptibility artifact is visualized in the lower central right breast in association with a small 1.6 cm hematoma, corresponding to the site of biopsy-proven DCIS. There is nonmass enhancement extending both anterior and posterior to the biopsy clip measuring approximately 5.7 cm ([id number]:26). Distance From Nipple:  Linear nonmass enhancement extends to the base of the nipple. Distance From Chest Wall: Approximately 6.2 cm. Distance From Skin: Approximately 3.1 cm from the skin of the lateral right breast. Chest Wall Invasion: No Skin Involvement: No Additional Features::None ADDITIONAL FINDINGS RIGHT BREAST: Postlumpectomy changes are visualized in the lower right breast. Biopsy clip susceptibility artifact is visualized in the upper outer right breast. There are scattered enhancing nonspecific foci.  There is no additional suspicious enhancement in the right breast. ADDITIONAL FINDINGS LEFT BREAST: Biopsy clip susceptibility artifact is visualized in the upper central left breast.  There are scattered enhancing nonspecific foci.  There is no suspicious enhancement in the left breast. AXILLA/OTHER: There is no significant axillary or internal mammary lymphadenopathy.  Small hepatic cysts are again noted. There is a small hiatus hernia. BI-RADS 4A, advise right MRI guided core biopsy 3/26/2025 Right breast anterior central MRI guided core biopsy (buckle clip). Pathology: - FOCAL ATYPICAL DUCTAL HYPERPLASIA - FIBROCYSTIC CHANGE Results are CONCORDANT. Please note the nonmass enhancement of which representative anterior aspect was biopsied expands approximately 5.7 cm in maximum AP dimension as seen on MRI dated 3/19/2025 extending from the biopsy-proven DCIS (cork clip) up to the base of the nipple including distribution of calcifications seen on mammogram. If breast conservation is desired recommend bracketed localization with wide excision anterior to the buckle clip (atypia) up to the base of the nipple and approximately 2 cm posterior to the cork clip (DCIS) to include associated nonmass enhancement on MRI (best seen on image 27 of series [id number] from MRI dated 3/19/2020). Rad ONC: She saw Dr. Miller on 3/31/2025. A second course of radiation therapy is feasible, given the interval of >10 years from the prior therapy, but would likely result in further fibrosis/scarring of the breast 4/18/2025 Right total mastectomy, injection of right breast with Magtrace, right oncoplastic flat chest wall closure. 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 Chandler, saw Nani BHARDWAJ 4/29/2025, seeing Dr. Chandler  5/6/2025. She is with her sister, Marlene. She notes right axillary discomfort and some numbness.

## 2025-05-06 NOTE — REASON FOR VISIT
[Post Op: _________] : a [unfilled] post op visit Alert & oriented; no sensory, motor or coordination deficits, normal reflexes

## 2025-05-08 NOTE — PHYSICAL EXAM
[de-identified] : NAD, AxOx3  [de-identified] : nonlabored breathing  [de-identified] : Right flat chest incision clean, dry, and intact  Mild central maceration  Swelling is present to the right lateral chest  No evidence of infection  Drain serous in output  [de-identified] : no edema  [de-identified] : as above  [de-identified] : normal affect

## 2025-05-08 NOTE — HISTORY OF PRESENT ILLNESS
[FreeTextEntry1] : YASIR CUELLAR is an 78 year old female who presents today s/p right mastectomy with oncoplastic flat closure-adjacent tissue transfer  on 4/19/25. Patient with no complaints today. She states that numbness in her arm has improved. As per patient, the remaining drain meets criteria for removal.

## 2025-05-08 NOTE — ADDENDUM
[FreeTextEntry1] :  I, Leonela Lucio, documented this note as a scribe on behalf of Dr. Lauren Shikowitz-Behr, MD on 05/06/2025.

## 2025-05-08 NOTE — CONSULT LETTER
[DrPapito ___] : Dr. GARDNER [FreeTextEntry2] : Susan Palleschi,  [facility] [address] [place] [address] [FreeTextEntry3] : Eliza Green MD Associate Chief  [facility] [place]  of Medicine [facility]

## 2025-05-08 NOTE — PHYSICAL EXAM
[de-identified] : NAD, AxOx3  [de-identified] : nonlabored breathing  [de-identified] : Right flat chest incision clean, dry, and intact  Mild central maceration  Swelling is present to the right lateral chest  No evidence of infection  Drain serous in output  [de-identified] : no edema  [de-identified] : as above  [de-identified] : normal affect

## 2025-05-08 NOTE — END OF VISIT
[FreeTextEntry3] : All medical record entries made by the Scribe were at my, Dr. Lauren Shikowitz-Behr, MD, direction and personally dictated by me on 05/06/2025. I have reviewed the chart and agree that the record accurately reflects my personal performance of the history, physical exam, assessment and plan. I have also personally directed, reviewed, and agreed with the chart.

## 2025-05-08 NOTE — HISTORY OF PRESENT ILLNESS
[de-identified] : 2014 Right breast cancer diagnosed at age 67, Stage I. 1/29/14 Right 6:00 lumpectomy, right axillary sentinel lymph node biopsy. Pathology invasive ductal carcinoma, grade 2, measured 1 cm, 1 LN negative, ER+/ME+/Her2-.  2014 Completed adjuvant radiation with Dr. Carmela Ruvalcaba. 5/14 - 10/19 Completed 5+ years of endocrine therapy with AI and tamoxifen. AI from 5/14 to 6/15 but did not tolerate due to arthralgias. Was on tamoxifen from 6/15 - 10/19.  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 In the lower central RIGHT breast, anterior to middle depth, there are loosely grouped calcifications in linear distribution. There are stable postsurgical changes in the central posterior RIGHT breast and RIGHT axilla. No suspicious mass, suspicious microcalcifications, or other sign of malignancy is identified in the LEFT breast. Bilateral biopsy clips in place. Stable benign calcifications in the central posterior LEFT breast. Bilateral ultrasound: No suspicious solid mass in right breast. Stable postsurgical scarring at 6:00 in the right breast. In the left breast no suspicious solid mass. BI-RADS 0, recommend further evaluation with right diagnostic mammography. 3/3/2025 Right diagnostic mammogram: A grouping of indeterminate calcifications is seen in the central lower breast. Multiple additional similar appearing calcifications are noted in a linear distribution. Some of these may be vascular, but further evaluation with stereotactic biopsy is advised. BI-RADS 4A, advise right stereotactic guided core biopsy. 3/7/2025 Right breast lower central stereotactic guided core biopsy (cork clip). Pathology showed DCIS, low to intermediate nuclear grade, solid type with calcifications and central necrosis, ER 98%, ME 90%. 3/12/2025 [facility] Genetic testing: negative 3/19/225 Bilateral breast MRI: Right breast Biopsy clip susceptibility artifact is visualized in the lower central right breast in association with a small 1.6 cm hematoma, corresponding to the site of biopsy-proven DCIS. There is nonmass enhancement extending both anterior and posterior to the biopsy clip measuring approximately 5.7 cm. Postlumpectomy changes are visualized in the lower right breast. Biopsy clip susceptibility artifact is visualized in the upper outer right breast. There are scattered enhancing nonspecific foci. There is no additional suspicious enhancement in the right breast. In the left breast Biopsy clip susceptibility artifact is visualized in the upper central left breast. There are scattered enhancing nonspecific foci. There is no suspicious enhancement in the left breast. There is no significant axillary or internal mammary lymphadenopathy. Small hepatic cysts are again noted. There is a small hiatus hernia. BI-RADS 4A, advise right MRI guided core biopsy 3/26/2025 Right breast anterior central MRI guided core biopsy (buckle clip). Pathology: focal ADH and fibrocystic change. Results are CONCORDANT. Please note the nonmass enhancement of which representative anterior aspect was biopsied expands approximately 5.7 cm in maximum AP dimension as seen on MRI dated 3/19/2025 extending from the biopsy-proven DCIS (cork clip) up to the base of the nipple including distribution of calcifications seen on mammogram. If breast conservation is desired recommend bracketed localization with wide excision anterior to the buckle clip (atypia) up to the base of the nipple and approximately 2 cm posterior to the cork clip (DCIS) to include associated nonmass enhancement on MRI (best seen on image 27 of series [id number] from MRI dated 3/19/2020). Rad Oncology: She saw Dr. Miller on 3/31/2025. A second course of radiation therapy is feasible, given the interval of >10 years from the prior therapy, but would likely result in further fibrosis/scarring of the breast. 4/18/2025 Right total mastectomy by Dr. Palleschi and Oncoplastic flat chest wall closure by Dr. Chandler Pathology showed Ductal carcinoma in situ (DCIS), solid type, intermediate to high nuclear grade involving lactiferous ducts. DCIS is at least 3 mm from the surface of the skin  [de-identified] : DCIS, solid type, intermediate to high nuclear grade, ER 98%, UT 90% [de-identified] : Agnieszka comes to discuss the medical management of her newly diagnosed DCIS. She is healing well from her mastectomy.  HEALTH MAINTENANCE: PCP: Dr. Katarzyna Franklin GYN: Dr. Naima Henley Breast Surgeon: Dr. Susan Palleschi Mammogram & sonogram: 2/27/25 Pap Smear: 2023 negative Bone density: 2/27/25 showed T scores of -2.0 in spine, -1.3 in femoral neck, -1.2 in total hip Colonoscopy: 2019 no polyps Genetic testing: 3/12/2025 [facility] panel negative

## 2025-05-08 NOTE — PHYSICAL EXAM
[de-identified] : S/P Right mastectomy with well healing incision. No masses in left breast or axilla

## 2025-05-08 NOTE — PHYSICAL EXAM
[de-identified] : NAD, AxOx3  [de-identified] : nonlabored breathing  [de-identified] : Right flat chest incision clean, dry, and intact  Mild central maceration  Swelling is present to the right lateral chest  No evidence of infection  Drain serous in output  [de-identified] : no edema  [de-identified] : as above  [de-identified] : normal affect

## 2025-05-21 NOTE — ADDENDUM
[FreeTextEntry1] :  I, Leonela Lucio, documented this note as a scribe on behalf of Dr. Lauren Shikowitz-Behr, MD on 05/20/2025.

## 2025-05-21 NOTE — END OF VISIT
[FreeTextEntry3] : All medical record entries made by the Scribe were at my, Dr. Lauren Shikowitz-Behr, MD, direction and personally dictated by me on 05/20/2025. I have reviewed the chart and agree that the record accurately reflects my personal performance of the history, physical exam, assessment and plan. I have also personally directed, reviewed, and agreed with the chart.

## 2025-05-21 NOTE — PHYSICAL EXAM
[de-identified] : NAD,AxOX3  [de-identified] : nonlabored breathing  [de-identified] : Right chest incision clean, dry, and intact  Healing well  Palpable fluid collection  No evidence of infection [de-identified] : no edema  [de-identified] : as above [de-identified] : normal affect

## 2025-05-21 NOTE — PHYSICAL EXAM
[de-identified] : NAD,AxOX3  [de-identified] : nonlabored breathing  [de-identified] : Right chest incision clean, dry, and intact  Healing well  Palpable fluid collection  No evidence of infection [de-identified] : no edema  [de-identified] : as above [de-identified] : normal affect

## 2025-05-21 NOTE — HISTORY OF PRESENT ILLNESS
[FreeTextEntry1] : YASIR CUELLAR is an 78 year old female who presents today s/p right mastectomy with oncoplastic flat closure /ATT on 4/19/25. Patient complaining about mild swelling. Otherwise she has no other complaints.

## 2025-05-23 NOTE — HISTORY OF PRESENT ILLNESS
[FreeTextEntry1] : 78 year old female who presents today s/p right mastectomy with oncoplastic flat closure /ATT on 4/19/25. Patient complaining about mild swelling. Otherwise she has no other complaints.She is s/p aspiration of right breast

## 2025-05-23 NOTE — PHYSICAL EXAM
[de-identified] : right breast incision well healed. under sterile technique 35 cc's of seroma aspirated. pt tolerated well. no erythema/warmth

## 2025-05-29 NOTE — END OF VISIT
[FreeTextEntry3] : All medical record entries made by the Scribe were at my, Dr. Lauren Shikowitz-Behr, MD, direction and personally dictated by me on 05/27/2025. I have reviewed the chart and agree that the record accurately reflects my personal performance of the history, physical exam, assessment and plan. I have also personally directed, reviewed, and agreed with the chart.

## 2025-05-29 NOTE — ADDENDUM
[FreeTextEntry1] :  I, Leonela Lucio, documented this note as a scribe on behalf of Dr. Lauren Shikowitz-Behr, MD on 05/27/2025.

## 2025-05-29 NOTE — PHYSICAL EXAM
[de-identified] : NAd, AxOx3  [de-identified] : nonlabored breathing  [de-identified] : Right flat chest incision is well healed Minimal salome incisional erythema  Palpable fluid collection  NO evidence of infection, or skin breakdown  [de-identified] : no edema  [de-identified] : as above [de-identified] : normal affect

## 2025-05-29 NOTE — ADDENDUM
Hypothyroidism  Hypothyroidism is a disorder of the thyroid. The thyroid is a large gland that is located in the lower front of the neck. The thyroid releases hormones that control how the body works. With hypothyroidism, the thyroid does not make enough of these hormones.  CAUSES  Causes of hypothyroidism may include:  · Viral infections.  · Pregnancy.  · Your own defense system (immune system) attacking your thyroid.  · Certain medicines.  · Birth defects.  · Past radiation treatments to your head or neck.  · Past treatment with radioactive iodine.  · Past surgical removal of part or all of your thyroid.  · Problems with the gland that is located in the center of your brain (pituitary).  SIGNS AND SYMPTOMS  Signs and symptoms of hypothyroidism may include:  · Feeling as though you have no energy (lethargy).  · Inability to tolerate cold.  · Weight gain that is not explained by a change in diet or exercise habits.  · Dry skin.  · Coarse hair.  · Menstrual irregularity.  · Slowing of thought processes.  · Constipation.  · Sadness or depression.  DIAGNOSIS   Your health care provider may diagnose hypothyroidism with blood tests and ultrasound tests.  TREATMENT  Hypothyroidism is treated with medicine that replaces the hormones that your body does not make. After you begin treatment, it may take several weeks for symptoms to go away.  HOME CARE INSTRUCTIONS   · Take medicines only as directed by your health care provider.  · If you start taking any new medicines, tell your health care provider.  · Keep all follow-up visits as directed by your health care provider. This is important. As your condition improves, your dosage needs may change. You will need to have blood tests regularly so that your health care provider can watch your condition.  SEEK MEDICAL CARE IF:  · Your symptoms do not get better with treatment.  · You are taking thyroid replacement medicine and:    You sweat excessively.    You have tremors.    You  [FreeTextEntry1] :  I, Leonela Lucio, documented this note as a scribe on behalf of Dr. Lauren Shikowitz-Behr, MD on 05/27/2025. feel anxious.    You lose weight rapidly.    You cannot tolerate heat.    You have emotional swings.    You have diarrhea.    You feel weak.  SEEK IMMEDIATE MEDICAL CARE IF:   · You develop chest pain.  · You develop an irregular heartbeat.  · You develop a rapid heartbeat.     This information is not intended to replace advice given to you by your health care provider. Make sure you discuss any questions you have with your health care provider.     Document Released: 12/18/2006 Document Revised: 01/08/2016 Document Reviewed: 05/05/2015  [employer] Patient Education ©2017 [employer]

## 2025-05-29 NOTE — HISTORY OF PRESENT ILLNESS
[FreeTextEntry1] : YASIR CUELLAR is an 78 year old female who presents today s/p right mastectomy with oncoplastic flat closure/ATT on 4/19/25. At her last visit on 5/23/255, 35ccs of seroma fluid was aspirated for the right breast. Patient presents today concerned that more fluid has accumulated. She otherwise has no other complaints

## 2025-05-29 NOTE — PHYSICAL EXAM
[de-identified] : NAd, AxOx3  [de-identified] : nonlabored breathing  [de-identified] : Right flat chest incision is well healed Minimal salome incisional erythema  Palpable fluid collection  NO evidence of infection, or skin breakdown  [de-identified] : no edema  [de-identified] : as above [de-identified] : normal affect

## 2025-06-03 NOTE — HISTORY OF PRESENT ILLNESS
[FreeTextEntry1] : 78 year old female who presents today s/p right mastectomy with oncoplastic flat closure /ATT on 4/19/25. Patient complaining about mild swelling. Otherwise she has no other complaints. She is s/p serial aspiration of right breast, most recently Dr. Chandler placed a pressure dressing on and she presents today for a f/u visit. No complaints today.

## 2025-06-06 NOTE — ADDENDUM
[FreeTextEntry1] :  I, Leonela Lucio, documented this note as a scribe on behalf of Dr. Lauren Shikowitz-Behr, MD on 06/06/2025.

## 2025-06-06 NOTE — END OF VISIT
[FreeTextEntry3] : All medical record entries made by the Scribe were at my, Dr. Lauren Shikowitz-Behr, MD, direction and personally dictated by me on 06/06/2025. I have reviewed the chart and agree that the record accurately reflects my personal performance of the history, physical exam, assessment and plan. I have also personally directed, reviewed, and agreed with the chart.

## 2025-06-06 NOTE — PHYSICAL EXAM
[NI] : Normal [de-identified] : NAD, AxOx3  [de-identified] : nonlabored breathing  [de-identified] : right chest incision clean, dry, and intact  healing well  no evidence of infection, fluid collection, or skin breakdown [de-identified] : no edema  [de-identified] : normal affect

## 2025-06-06 NOTE — HISTORY OF PRESENT ILLNESS
[FreeTextEntry1] : YASIR CUELLAR is an 78 year old female who presents today s/p right mastectomy with oncoplastic flat closure/ATT  on 4/19/25. At her last visit with Matthew on 5/30/25, no collection was observed. Patient with no complaints today.

## 2025-06-19 NOTE — ASSESSMENT
[FreeTextEntry1] : History of Stage IA right breast cancer, s/p right lumpectomy and XRT in 2014, followed by antihormonal medication. S/P right total mastectomy with oncoplastic flat chest wall closure 4/18/25 surgical path DCIS, solid type, intermediate to high nuclear grade involving lactiferous ducts, 3 mm from the surface of the skin, ER+/UT+    1. Annual left mammogram and breast ultrasound due 2/2026. 2. Follow up office visit 9/2025 3. Advised monthly self breast examinations and advised her to contact me if she has any concerns. 4. Follow up Medical Oncology with Dr. Green. 5. Physical Therapy: continue PT with JOSE DANIEL. 6. She will follow up with Dr. Malik of [facility] 7. She is requesting recommendation for a psychotherapist. I sent an email to [facility] to assist.

## 2025-06-19 NOTE — ASSESSMENT
[FreeTextEntry1] : History of Stage IA right breast cancer, s/p right lumpectomy and XRT in 2014, followed by antihormonal medication. S/P right total mastectomy with oncoplastic flat chest wall closure 4/18/25 surgical path DCIS, solid type, intermediate to high nuclear grade involving lactiferous ducts, 3 mm from the surface of the skin, ER+/TX+    1. Annual left mammogram and breast ultrasound due 2/2026. 2. Follow up office visit 9/2025 3. Advised monthly self breast examinations and advised her to contact me if she has any concerns. 4. Follow up Medical Oncology with Dr. Green. 5. Physical Therapy: continue PT with JOSE DANIEL. 6. She will follow up with Dr. Malik of [facility] 7. She is requesting recommendation for a psychotherapist. I sent an email to [facility] to assist.

## 2025-06-19 NOTE — HISTORY OF PRESENT ILLNESS
[FreeTextEntry1] : 78 year old female here today for her 2nd post-op visit. PCP: Dr. Katarzyna Chand Gyn: Dr. Naima Henley Pulmonologist: Dr. Leonardo Gage (asthma) She has a history of right breast cancer (Stage IA, 1/29/2014, age 67), s/p right 6:00 lumpectomy, right axillary sentinel lymph node biopsy. Pathology invasive ductal carcinoma, grade 2, measured 1 cm, 1 LN negative, ER+/SC+/Her2- No family history of breast or ovarian cancer. Her paternal grandmother and maternal grandfather had lung cancer.  No genetic testing done previously. She is s/p benign left breast MRI guided biopsy on 1/21/2014 and benign right breast ultrasound guided core biopsy on 7/14/2014. 2/27/2025 Bilateral Mammogram: There are scattered areas of fibroglandular density. In the lower central RIGHT breast, anterior to middle depth, there are loosely grouped calcifications in linear distribution. There are stable postsurgical changes in the central posterior RIGHT breast and RIGHT axilla. No suspicious mass, suspicious microcalcifications, or other sign of malignancy is identified in the LEFT breast. Bilateral biopsy clips remain in place. Stable benign calcifications in the central posterior LEFT breast. BREAST ARTERIAL CALCIFICATION (CIERA): Grade 0 - No vascular calcifications. Note: The absence of breast arterial calcification does not exclude cardiovascular disease. Management of cardiovascular risk factors should be based clinically. Bilateral ultrasound:  RIGHT BREAST: No suspicious solid mass. Stable postsurgical scarring at 6:00 in the RIGHT breast. LEFT BREAST: No suspicious solid mass. BI-RADS 0, recommend further evaluation with right diagnostic mammography. 3/3/2025 Right diagnostic mammogram: A grouping of indeterminate calcifications is seen in the central lower breast. Multiple additional similar appearing calcifications are noted in a linear distribution. Some of these may be vascular, but further evaluation with stereotactic biopsy is advised. BI-RADS 4A, advise right stereotactic guided core biopsy. 3/7/2025 Right breast lower central stereotactic guided core biopsy (cork clip). Pathology: - DUCTAL CARCINOMA IN SITU, LOW TO INTERMEDIATE NUCLEAR GRADE, SOLID TYPE, WITH CALCIFICATIONS AND CENTRAL NECROSIS. - ER+(98%)/SC+(90%) These results are CONCORDANT. Given the malignant biopsy results above, surgical consultation and management is recommended. Please note the biopsied calcifications span approximately 3.5 cm in AP extent, to within 1.8 cm of the right nipple on magnification views from the study dated 3/3/2025. 3/12/2025 [facility] Genetic testing: negative 3/19/225 Bilateral breast MRI: RECENTLY DIAGNOSED MALIGNANCY: Breast: Right Location: Biopsy clip susceptibility artifact is visualized in the lower central right breast in association with a small 1.6 cm hematoma, corresponding to the site of biopsy-proven DCIS. There is nonmass enhancement extending both anterior and posterior to the biopsy clip measuring approximately 5.7 cm (81429:[id number]). Distance From Nipple:  Linear nonmass enhancement extends to the base of the nipple. Distance From Chest Wall: Approximately 6.2 cm. Distance From Skin: Approximately 3.1 cm from the skin of the lateral right breast. Chest Wall Invasion: No Skin Involvement: No Additional Features::None ADDITIONAL FINDINGS RIGHT BREAST: Postlumpectomy changes are visualized in the lower right breast. Biopsy clip susceptibility artifact is visualized in the upper outer right breast. There are scattered enhancing nonspecific foci.  There is no additional suspicious enhancement in the right breast. ADDITIONAL FINDINGS LEFT BREAST: Biopsy clip susceptibility artifact is visualized in the upper central left breast.  There are scattered enhancing nonspecific foci.  There is no suspicious enhancement in the left breast. AXILLA/OTHER: There is no significant axillary or internal mammary lymphadenopathy.  Small hepatic cysts are again noted. There is a small hiatus hernia. BI-RADS 4A, advise right MRI guided core biopsy 3/26/2025 Right breast anterior central MRI guided core biopsy (buckle clip). Pathology: - FOCAL ATYPICAL DUCTAL HYPERPLASIA - FIBROCYSTIC CHANGE Results are CONCORDANT. Please note the nonmass enhancement of which representative anterior aspect was biopsied expands approximately 5.7 cm in maximum AP dimension as seen on MRI dated 3/19/2025 extending from the biopsy-proven DCIS (cork clip) up to the base of the nipple including distribution of calcifications seen on mammogram. If breast conservation is desired recommend bracketed localization with wide excision anterior to the buckle clip (atypia) up to the base of the nipple and approximately 2 cm posterior to the cork clip (DCIS) to include associated nonmass enhancement on MRI (best seen on image 27 of series [id number] from MRI dated 3/19/2020). 4/18/2025 Right total mastectomy, injection of right breast with Magtrace, right oncoplastic flat chest wall closure. 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 Chandler, saw her on 6/6/2025. She has performed aspirations of right chest wall seroma. Rad ONC: Previously Dr. Ruvalcaba, previously completed XRT in 2014.  She saw rad onc Dr. Miller, last saw on 3/31/2025. A second course of radiation therapy is feasible (if she opts for breast conservation), given the interval of >10 years from the prior therapy, but would likely result in further fibrosis/scarring of the breast. Med ONC: Dr. Green, previously completed 5 years + of antihormonal medication (last being Tamoxifen) 1/31/2020.  She started Letrozole 5/2025. Saw last her 5/8/2025. She has been undergoing PT at [facility] 2x per week. She states she is doing well with her right upper extremity ROM but states she has recurrent torn rotator cuff symptoms. She will follow up with her orthopedic surgeon, Dr. Malik She was fitted for a breast prosthesis at [facility].

## 2025-06-19 NOTE — PHYSICAL EXAM
[de-identified] : S/P right total mastectomy with oncoplastic flat chest wall closure. Incision C/D/I, no erythema or warmth, no evidence of infection, no palpable seroma/hematoma.

## 2025-06-19 NOTE — HISTORY OF PRESENT ILLNESS
[FreeTextEntry1] : 78 year old female here today for her 2nd post-op visit. PCP: Dr. Katarzyna Chand Gyn: Dr. Naima Henley Pulmonologist: Dr. Leonardo Gage (asthma) She has a history of right breast cancer (Stage IA, 1/29/2014, age 67), s/p right 6:00 lumpectomy, right axillary sentinel lymph node biopsy. Pathology invasive ductal carcinoma, grade 2, measured 1 cm, 1 LN negative, ER+/GA+/Her2- No family history of breast or ovarian cancer. Her paternal grandmother and maternal grandfather had lung cancer.  No genetic testing done previously. She is s/p benign left breast MRI guided biopsy on 1/21/2014 and benign right breast ultrasound guided core biopsy on 7/14/2014. 2/27/2025 Bilateral Mammogram: There are scattered areas of fibroglandular density. In the lower central RIGHT breast, anterior to middle depth, there are loosely grouped calcifications in linear distribution. There are stable postsurgical changes in the central posterior RIGHT breast and RIGHT axilla. No suspicious mass, suspicious microcalcifications, or other sign of malignancy is identified in the LEFT breast. Bilateral biopsy clips remain in place. Stable benign calcifications in the central posterior LEFT breast. BREAST ARTERIAL CALCIFICATION (CIERA): Grade 0 - No vascular calcifications. Note: The absence of breast arterial calcification does not exclude cardiovascular disease. Management of cardiovascular risk factors should be based clinically. Bilateral ultrasound:  RIGHT BREAST: No suspicious solid mass. Stable postsurgical scarring at 6:00 in the RIGHT breast. LEFT BREAST: No suspicious solid mass. BI-RADS 0, recommend further evaluation with right diagnostic mammography. 3/3/2025 Right diagnostic mammogram: A grouping of indeterminate calcifications is seen in the central lower breast. Multiple additional similar appearing calcifications are noted in a linear distribution. Some of these may be vascular, but further evaluation with stereotactic biopsy is advised. BI-RADS 4A, advise right stereotactic guided core biopsy. 3/7/2025 Right breast lower central stereotactic guided core biopsy (cork clip). Pathology: - DUCTAL CARCINOMA IN SITU, LOW TO INTERMEDIATE NUCLEAR GRADE, SOLID TYPE, WITH CALCIFICATIONS AND CENTRAL NECROSIS. - ER+(98%)/GA+(90%) These results are CONCORDANT. Given the malignant biopsy results above, surgical consultation and management is recommended. Please note the biopsied calcifications span approximately 3.5 cm in AP extent, to within 1.8 cm of the right nipple on magnification views from the study dated 3/3/2025. 3/12/2025 [facility] testing: negative 3/19/225 Bilateral breast MRI: RECENTLY DIAGNOSED MALIGNANCY: Breast: Right Location: Biopsy clip susceptibility artifact is visualized in the lower central right breast in association with a small 1.6 cm hematoma, corresponding to the site of biopsy-proven DCIS. There is nonmass enhancement extending both anterior and posterior to the biopsy clip measuring approximately 5.7 cm (55715:26). Distance From Nipple:  Linear nonmass enhancement extends to the base of the nipple. Distance From Chest Wall: Approximately 6.2 cm. Distance From Skin: Approximately 3.1 cm from the skin of the lateral right breast. Chest Wall Invasion: No Skin Involvement: No Additional Features::None ADDITIONAL FINDINGS RIGHT BREAST: Postlumpectomy changes are visualized in the lower right breast. Biopsy clip susceptibility artifact is visualized in the upper outer right breast. There are scattered enhancing nonspecific foci.  There is no additional suspicious enhancement in the right breast. ADDITIONAL FINDINGS LEFT BREAST: Biopsy clip susceptibility artifact is visualized in the upper central left breast.  There are scattered enhancing nonspecific foci.  There is no suspicious enhancement in the left breast. AXILLA/OTHER: There is no significant axillary or internal mammary lymphadenopathy.  Small hepatic cysts are again noted. There is a small hiatus hernia. BI-RADS 4A, advise right MRI guided core biopsy 3/26/2025 Right breast anterior central MRI guided core biopsy (buckle clip). Pathology: - FOCAL ATYPICAL DUCTAL HYPERPLASIA - FIBROCYSTIC CHANGE Results are CONCORDANT. Please note the nonmass enhancement of which representative anterior aspect was biopsied expands approximately 5.7 cm in maximum AP dimension as seen on MRI dated 3/19/2025 extending from the biopsy-proven DCIS (cork clip) up to the base of the nipple including distribution of calcifications seen on mammogram. If breast conservation is desired recommend bracketed localization with wide excision anterior to the buckle clip (atypia) up to the base of the nipple and approximately 2 cm posterior to the cork clip (DCIS) to include associated nonmass enhancement on MRI (best seen on image 27 of series 35140 from MRI dated 3/19/2020). 4/18/2025 Right total mastectomy, injection of right breast with Magtrace, right oncoplastic flat chest wall closure. 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 Chandler, saw her on 6/6/2025. She has performed aspirations of right chest wall seroma. Rad ONC: Previously Dr. Ruvalcaba, previously completed XRT in 2014.  She saw rad onc Dr. Miller, last saw on 3/31/2025. A second course of radiation therapy is feasible (if she opts for breast conservation), given the interval of >10 years from the prior therapy, but would likely result in further fibrosis/scarring of the breast. Med ONC: Dr. Green, previously completed 5 years + of antihormonal medication (last being Tamoxifen) 1/31/2020.  She started Letrozole 5/2025. Saw last her 5/8/2025. She has been undergoing PT at [facility] 2x per week. She states she is doing well with her right upper extremity ROM but states she has recurrent torn rotator cuff symptoms. She will follow up with her orthopedic surgeon, Dr. Malik She was fitted for a breast prosthesis at [facility].

## 2025-06-19 NOTE — CONSULT LETTER
[FreeTextEntry3] : Susan M. Palleschi, MD, FACS Division of Breast Surgery Director, Breast Surgery [address] [address] (Phone) [phone] (Fax) [phone]

## 2025-06-19 NOTE — CONSULT LETTER
[FreeTextEntry3] : Susan M. Palleschi, MD, FACS Division of Breast Surgery Director, Breast Surgery [address] [address] (Phone) [phone] (Fax) [phone]

## 2025-06-19 NOTE — PHYSICAL EXAM
[de-identified] : S/P right total mastectomy with oncoplastic flat chest wall closure. Incision C/D/I, no erythema or warmth, no evidence of infection, no palpable seroma/hematoma.

## 2025-07-16 NOTE — ADDENDUM
[FreeTextEntry1] :  I, Leonela Lucio, documented this note as a scribe on behalf of Dr. Lauren Shikowitz-Behr, MD on 07/15/2025.

## 2025-07-16 NOTE — PHYSICAL EXAM
[de-identified] : NAD, Axox3  [de-identified] : nonlabored breathing  [de-identified] : Right chest incision well healed Mild swelling to the right lateral chest  no evidence of infection, fluid collection, or skin breakdown  [de-identified] : decreased ROM RUE upon abduction no edema [de-identified] : normal affect

## 2025-07-16 NOTE — PHYSICAL EXAM
[de-identified] : NAD, Axox3  [de-identified] : nonlabored breathing  [de-identified] : Right chest incision well healed Mild swelling to the right lateral chest  no evidence of infection, fluid collection, or skin breakdown  [de-identified] : decreased ROM RUE upon abduction no edema [de-identified] : normal affect

## 2025-07-16 NOTE — PHYSICAL EXAM
[de-identified] : NAD, Axox3  [de-identified] : nonlabored breathing  [de-identified] : Right chest incision well healed Mild swelling to the right lateral chest  no evidence of infection, fluid collection, or skin breakdown  [de-identified] : decreased ROM RUE upon abduction no edema [de-identified] : normal affect

## 2025-07-16 NOTE — END OF VISIT
[FreeTextEntry3] : All medical record entries made by the Scribe were at my, Dr. Lauren Shikowitz-Behr, MD, direction and personally dictated by me on 07/15/2025. I have reviewed the chart and agree that the record accurately reflects my personal performance of the history, physical exam, assessment and plan. I have also personally directed, reviewed, and agreed with the chart.

## 2025-07-16 NOTE — HISTORY OF PRESENT ILLNESS
[FreeTextEntry1] : YASIR CUELLAR is an 78-year-old female who presents today s/p  right mastectomy , IBR with flat closure/ATT on 4/19/25. Patient with no complaints. She's finding good results with PT.